# Patient Record
Sex: FEMALE | Race: WHITE
[De-identification: names, ages, dates, MRNs, and addresses within clinical notes are randomized per-mention and may not be internally consistent; named-entity substitution may affect disease eponyms.]

---

## 2018-01-09 ENCOUNTER — HOSPITAL ENCOUNTER (OUTPATIENT)
Dept: HOSPITAL 45 - C.RADBC | Age: 38
Discharge: HOME | End: 2018-01-09
Attending: UROLOGY
Payer: COMMERCIAL

## 2018-01-09 DIAGNOSIS — N20.0: Primary | ICD-10-CM

## 2018-01-09 NOTE — DIAGNOSTIC IMAGING REPORT
KUB



CLINICAL HISTORY: NEPHROLITHIASIS    



COMPARISON STUDY:  12/27/2016 



FINDINGS: No significant nephrocalcinosis on the current study. Interval removal

of the left ureteral stent.



Calcifications a left lateral soft tissue pelvic region most likely vascular.

Bowel pattern is nonobstructive.



IMPRESSION:  No significant nephrocalcinosis. Nonobstructive bowel pattern. 













The above report was generated using voice recognition software.  It may contain

grammatical, syntax or spelling errors.







Electronically signed by:  Evan Becerra M.D.

1/9/2018 11:20 AM



Dictated Date/Time:  1/9/2018 11:19 AM

## 2018-03-05 ENCOUNTER — HOSPITAL ENCOUNTER (OUTPATIENT)
Dept: HOSPITAL 45 - C.PAPS | Age: 38
Discharge: HOME | End: 2018-03-05
Attending: OBSTETRICS & GYNECOLOGY
Payer: COMMERCIAL

## 2018-03-05 DIAGNOSIS — Z01.419: Primary | ICD-10-CM

## 2018-04-06 ENCOUNTER — HOSPITAL ENCOUNTER (OUTPATIENT)
Dept: HOSPITAL 45 - C.PATHSPEC | Age: 38
Discharge: HOME | End: 2018-04-06
Attending: OBSTETRICS & GYNECOLOGY
Payer: COMMERCIAL

## 2018-04-06 DIAGNOSIS — B97.7: Primary | ICD-10-CM
